# Patient Record
Sex: FEMALE | Race: OTHER
[De-identification: names, ages, dates, MRNs, and addresses within clinical notes are randomized per-mention and may not be internally consistent; named-entity substitution may affect disease eponyms.]

---

## 2017-02-03 ENCOUNTER — HOSPITAL ENCOUNTER (OUTPATIENT)
Dept: HOSPITAL 62 - CCC | Age: 52
End: 2017-02-03
Payer: COMMERCIAL

## 2017-02-03 DIAGNOSIS — I10: Primary | ICD-10-CM

## 2017-02-03 LAB
ALBUMIN SERPL-MCNC: 4.3 G/DL (ref 3.5–5)
ALP SERPL-CCNC: 101 U/L (ref 38–126)
ALT SERPL-CCNC: 27 U/L (ref 9–52)
ANION GAP SERPL CALC-SCNC: 10 MMOL/L (ref 5–19)
AST SERPL-CCNC: 22 U/L (ref 14–36)
BILIRUB DIRECT SERPL-MCNC: 0 MG/DL (ref 0–0.3)
BILIRUB SERPL-MCNC: 0.4 MG/DL (ref 0.2–1.3)
BUN SERPL-MCNC: 26 MG/DL (ref 7–20)
CALCIUM: 9.7 MG/DL (ref 8.4–10.2)
CHLORIDE SERPL-SCNC: 101 MMOL/L (ref 98–107)
CO2 SERPL-SCNC: 32 MMOL/L (ref 22–30)
CREAT SERPL-MCNC: 0.67 MG/DL (ref 0.52–1.25)
GLUCOSE SERPL-MCNC: 94 MG/DL (ref 75–110)
POTASSIUM SERPL-SCNC: 4 MMOL/L (ref 3.6–5)
PROT SERPL-MCNC: 7.4 G/DL (ref 6.3–8.2)
SODIUM SERPL-SCNC: 142.9 MMOL/L (ref 137–145)

## 2017-02-03 PROCEDURE — 36415 COLL VENOUS BLD VENIPUNCTURE: CPT

## 2017-02-03 PROCEDURE — 80061 LIPID PANEL: CPT

## 2017-02-03 PROCEDURE — 80053 COMPREHEN METABOLIC PANEL: CPT

## 2017-02-03 PROCEDURE — 84436 ASSAY OF TOTAL THYROXINE: CPT

## 2017-02-03 PROCEDURE — 84443 ASSAY THYROID STIM HORMONE: CPT

## 2017-02-03 PROCEDURE — 83036 HEMOGLOBIN GLYCOSYLATED A1C: CPT

## 2017-02-07 LAB
CHOLEST SERPL-MCNC: 212.31 MG/DL (ref 0–200)
DIRECT HDL: 47 MG/DL (ref 40–?)
LDLC SERPL DIRECT ASSAY-MCNC: 157 MG/DL (ref ?–100)
TRIGL SERPL-MCNC: 71 MG/DL (ref ?–150)
VLDLC SERPL CALC-MCNC: 14 MG/DL (ref 10–31)

## 2019-08-06 ENCOUNTER — HOSPITAL ENCOUNTER (EMERGENCY)
Dept: HOSPITAL 62 - ER | Age: 54
Discharge: HOME | End: 2019-08-06
Payer: SELF-PAY

## 2019-08-06 VITALS — DIASTOLIC BLOOD PRESSURE: 89 MMHG | SYSTOLIC BLOOD PRESSURE: 146 MMHG

## 2019-08-06 DIAGNOSIS — S80.211A: ICD-10-CM
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
## 2019-08-06 DIAGNOSIS — S80.00XA: Primary | ICD-10-CM
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
## 2019-08-06 DIAGNOSIS — S90.416A: ICD-10-CM

## 2019-08-06 DIAGNOSIS — S90.819A: ICD-10-CM

## 2019-08-06 DIAGNOSIS — F17.200: ICD-10-CM

## 2019-08-06 DIAGNOSIS — I10: ICD-10-CM

## 2019-08-06 PROCEDURE — 99283 EMERGENCY DEPT VISIT LOW MDM: CPT
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
## 2019-08-06 NOTE — RADIOLOGY REPORT (SQ)
EXAM DESCRIPTION:  FOOT RIGHT COMPLETE



COMPLETED DATE/TIME:  8/6/2019 9:30 am



REASON FOR STUDY:  foot injury



COMPARISON:  None.



NUMBER OF VIEWS:  Three views.



TECHNIQUE:  AP, lateral and oblique  radiographic images acquired of the right foot.



LIMITATIONS:  None.



FINDINGS:  MINERALIZATION: Normal.

BONES: No acute fracture or dislocation.  No worrisome bone lesions.

JOINTS: Hallux valgus deformity right great toe.  Mild medial soft tissue swelling.  Minimal bony spu
rring along the dorsal aspect of the 2nd and 3rd tarsometatarsal joints

SOFT TISSUES: 1st metatarsophalangeal joint soft tissue swelling.  No foreign body.

OTHER: No other significant finding.



IMPRESSION:  No acute findings



TECHNICAL DOCUMENTATION:  JOB ID:  [id number]

 2011 [employer]- All Rights Reserved



Reading location - IP/workstation name: [email or web address]

## 2019-10-09 ENCOUNTER — HOSPITAL ENCOUNTER (EMERGENCY)
Dept: HOSPITAL 62 - ER | Age: 54
Discharge: HOME | End: 2019-10-09
Payer: COMMERCIAL

## 2019-10-09 VITALS — DIASTOLIC BLOOD PRESSURE: 87 MMHG | SYSTOLIC BLOOD PRESSURE: 134 MMHG

## 2019-10-09 DIAGNOSIS — M25.561: ICD-10-CM

## 2019-10-09 DIAGNOSIS — E78.00: ICD-10-CM

## 2019-10-09 DIAGNOSIS — I10: ICD-10-CM

## 2019-10-09 DIAGNOSIS — F17.200: ICD-10-CM

## 2019-10-09 DIAGNOSIS — Z90.710: ICD-10-CM

## 2019-10-09 DIAGNOSIS — M25.521: ICD-10-CM

## 2019-10-09 DIAGNOSIS — M79.641: Primary | ICD-10-CM

## 2019-10-09 DIAGNOSIS — E78.5: ICD-10-CM

## 2019-10-09 PROCEDURE — 99283 EMERGENCY DEPT VISIT LOW MDM: CPT

## 2019-10-09 NOTE — RADIOLOGY REPORT (SQ)
EXAM DESCRIPTION:  ELBOW RIGHT OVER 2 VIEWS



COMPLETED DATE/TIME:  10/9/2019 11:30 am



REASON FOR STUDY:  pain



COMPARISON:  None.



NUMBER OF VIEWS:  Four views.



TECHNIQUE:  AP, lateral, and both oblique radiographic images acquired of the right elbow.



LIMITATIONS:  None.



FINDINGS:  MINERALIZATION: Normal.

BONES: No acute fracture or dislocation.  No worrisome bone lesions.

JOINT: No effusion.

SOFT TISSUES: No soft tissue swelling.  No foreign body.

OTHER: No other significant finding.



IMPRESSION:  NEGATIVE STUDY OF THE RIGHT ELBOW. NO RADIOGRAPHIC EVIDENCE OF ACUTE INJURY.



TECHNICAL DOCUMENTATION:  JOB ID:  [id number]

 2011 [employer]- All Rights Reserved



Reading location - [email or web address]

## 2019-10-09 NOTE — ER DOCUMENT REPORT
HPI





- HPI


Time Seen by Provider: 10/09/19 10:57


Pain Level: 5


Context: 





Patient is a 54-year-old female with a history of hypertension, hyperlipidemia, 

and arthritis who presents emergency department with a chief complaint of right 

hand pain, knee pain, and elbow pain.  She has been diagnosed with carpal tunnel

syndrome.  She wears her brace periodically.  She just recently started a new 

job in a mess drew, which has aggravated her carpal tunnel and her arthritis in 

her knee.  She took Goody powders to help with pain, which has given her a 

little relief.  She also uses Voltaren gel at home.





- ROS


Systems Reviewed and Negative: Yes All other systems reviewed and negative





- REPRODUCTIVE


Reproductive: DENIES: Pregnant:





- MUSCULOSKELETAL


Musculoskeletal: REPORTS: Extremity pain - Right knee, hand, and wrist





- DERM


Skin Color: Normal


Skin Problems: None





Past Medical History





- General


Information source: Patient





- Social History


Smoking Status: Current Every Day Smoker


Chew tobacco use (# tins/day): No


Frequency of alcohol use: Rare


Drug Abuse: None


Family History: Reviewed & Not Pertinent, CAD - Brother with 3 vessel bypass at 

53 years old., DM, Hypertension


Patient has suicidal ideation: No


Patient has homicidal ideation: No





- Past Medical History


Cardiac Medical History: Reports: Hx Hypercholesterolemia, Hx Hypertension


   Denies: Hx Coronary Artery Disease, Hx Heart Attack


Pulmonary Medical History: 


   Denies: Hx Asthma, Hx Bronchitis, Hx COPD, Hx Pneumonia, Hx Tuberculosis


Neurological Medical History: Reports: Hx Migraine.  Denies: Hx Cerebrovascular 

Accident, Hx Seizures


Renal/ Medical History: Denies: Hx Peritoneal Dialysis


GI Medical History: Reports: Hx Gastroesophageal Reflux Disease


Musculoskeletal Medical History: Denies Hx Arthritis


Past Surgical History: Reports: Hx Breast Surgery - lumpectomy, Hx Hysterectomy,

Hx Tubal Ligation.  Denies: Hx Pacemaker





- Immunizations


Hx Diphtheria, Pertussis, Tetanus Vaccination: Yes





Vertical Provider Document





- CONSTITUTIONAL


Agree With Documented VS: Yes


Exam Limitations: No Limitations


General Appearance: No Apparent Distress





- INFECTION CONTROL


TRAVEL OUTSIDE OF THE [place] IN LAST 30 DAYS: No





- HEENT


HEENT: Atraumatic, Normocephalic, PERRLA





- NECK


Neck: Normal Inspection





- RESPIRATORY


Respiratory: No Respiratory Distress





- CARDIOVASCULAR


Cardiovascular: Regular Rate, Regular Rhythm


Pulses: Normal: Radial, Dorsalis pedis





- MUSCULOSKELETAL/EXTREMETIES


Musculoskeletal/Extremeties: FROM, Tender - Right knee and right wrist





- NEURO


Level of Consciousness: Awake, Alert, Appropriate


Motor/Sensory: No Motor Deficit, No Sensory Deficit





- DERM


Integumentary: Warm, Dry, No Rash





Course





- Re-evaluation


Re-evalutation: 





10/09/19


There are no acute findings in the patient's x-rays at that time.  I suspect her

hand pain is an exacerbation of her carpal tunnel syndrome and her knee pain is 

from her arthritis.  She will be referred to orthopedics for further management.

 I encouraged her to continue to wear her Ace wrap or splint to help with her 

carpal tunnel.  She will also receive a prescription for naproxen for pain 

relief.  I instructed her not to take ibuprofen at this time.  Capillary refill 

less than 3 seconds.  Peripheral pulses 2+.  No vascular compromise noted.  Pat

ient able to move all limbs and digits with no difficulty.  Follow-up 

precautions were given.  Verbal discharge instructions were given to the 

patient.  They verbalized understanding.  They are stable for discharge.














- Vital Signs


Vital signs: 


                                        











Temp Pulse Resp BP Pulse Ox


 


 98.0 F   64   18   134/87 H  98 


 


 10/09/19 10:48  10/09/19 10:48  10/09/19 10:48  10/09/19 10:48  10/09/19 10:48














Discharge





- Discharge


Clinical Impression: 


Right knee pain


Qualifiers:


 Chronicity: chronic Qualified Code(s): M25.561 - Pain in right knee





Condition: Stable


Disposition: HOME, SELF-CARE


Additional Instructions: 


You are seen today in the emergency department for right hand pain, elbow pain, 

and knee pain.  The pain you are having is most likely due to your arthritis and

your carpal tunnel.  Please continue to use an Ace wrap or your brace to help 

with your symptoms.  You are being prescribed naproxen, medication to help with 

pain.  Please take this as directed.  Please follow-up with orthopedics in 

regards to this visit.  Please establish primary care provider.  They may be 

able to refer you for physical therapy.


Prescriptions: 


Naproxen Sodium [All Day Pain Relief] 220 mg PO BIDP PRN #60 tablet


 PRN Reason: 


Forms:  Return to Work


Referrals: 


CARLYN RODRIGUES MD [ACTIVE STAFF] - Follow up in 1 week

## 2019-10-09 NOTE — RADIOLOGY REPORT (SQ)
EXAM DESCRIPTION:  KNEE RIGHT 4 VIEWS



COMPLETED DATE/TIME:  10/9/2019 11:30 am



REASON FOR STUDY:  pain/arthritis



COMPARISON:  7/28/2012



NUMBER OF VIEWS:  Four views.



TECHNIQUE:  AP, lateral, and both oblique radiographic images acquired of the right knee.



LIMITATIONS:  None.



FINDINGS:  MINERALIZATION: Normal.

BONES: No acute fracture or dislocation.  No worrisome bone lesions.

JOINT: No effusion.

SOFT TISSUES: No soft tissue swelling.  No radio-opaque foreign body.

OTHER: No other significant finding.



IMPRESSION:  NEGATIVE STUDY OF THE RIGHT KNEE. NO RADIOGRAPHIC EVIDENCE OF ACUTE INJURY.



TECHNICAL DOCUMENTATION:  JOB ID:  [id number]

 2011 [employer]- All Rights Reserved



Reading location - IP/workstation name: [email or web address]

## 2019-10-09 NOTE — RADIOLOGY REPORT (SQ)
EXAM DESCRIPTION:  HAND RIGHT 3 VIEWS



COMPLETED DATE/TIME:  10/9/2019 11:30 am



REASON FOR STUDY:  pain/arthritis



COMPARISON:  7/12/2016



EXAM PARAMETERS:  NUMBER OF VIEWS: Three views.

TECHNIQUE: AP, lateral and oblique  radiographic images acquired of the right hand.

LIMITATIONS: None.



FINDINGS:  MINERALIZATION: Normal.

BONES: No acute fracture or dislocation.  No worrisome bone lesions.

JOINTS: No effusions.

SOFT TISSUES: No soft tissue swelling.  No foreign body.

OTHER: No other significant finding.



IMPRESSION:  NEGATIVE STUDY OF THE RIGHT HAND. NO RADIOGRAPHIC EVIDENCE OF ACUTE INJURY.



TECHNICAL DOCUMENTATION:  JOB ID:  [id number]

 2011 [employer]- All Rights Reserved



Reading location - IP/workstation name: [email or web address]

## 2019-12-22 ENCOUNTER — HOSPITAL ENCOUNTER (EMERGENCY)
Dept: HOSPITAL 62 - ER | Age: 54
Discharge: HOME | End: 2019-12-22
Payer: COMMERCIAL

## 2019-12-22 VITALS — DIASTOLIC BLOOD PRESSURE: 80 MMHG | SYSTOLIC BLOOD PRESSURE: 157 MMHG

## 2019-12-22 DIAGNOSIS — Z85.528: ICD-10-CM

## 2019-12-22 DIAGNOSIS — I10: ICD-10-CM

## 2019-12-22 DIAGNOSIS — R10.13: ICD-10-CM

## 2019-12-22 DIAGNOSIS — E78.00: ICD-10-CM

## 2019-12-22 DIAGNOSIS — R51: Primary | ICD-10-CM

## 2019-12-22 DIAGNOSIS — Z90.710: ICD-10-CM

## 2019-12-22 DIAGNOSIS — F17.200: ICD-10-CM

## 2019-12-22 LAB
A TYPE INFLUENZA AG: NEGATIVE
ADD MANUAL DIFF: NO
ALBUMIN SERPL-MCNC: 4.4 G/DL (ref 3.5–5)
ALP SERPL-CCNC: 116 U/L (ref 38–126)
ANION GAP SERPL CALC-SCNC: 9 MMOL/L (ref 5–19)
AST SERPL-CCNC: 20 U/L (ref 14–36)
B INFLUENZA AG: NEGATIVE
BASOPHILS # BLD AUTO: 0.1 10^3/UL (ref 0–0.2)
BASOPHILS NFR BLD AUTO: 0.7 % (ref 0–2)
BILIRUB DIRECT SERPL-MCNC: 0.2 MG/DL (ref 0–0.4)
BILIRUB SERPL-MCNC: 0.3 MG/DL (ref 0.2–1.3)
BUN SERPL-MCNC: 22 MG/DL (ref 7–20)
CALCIUM: 10 MG/DL (ref 8.4–10.2)
CHLORIDE SERPL-SCNC: 103 MMOL/L (ref 98–107)
CO2 SERPL-SCNC: 31 MMOL/L (ref 22–30)
EOSINOPHIL # BLD AUTO: 0.2 10^3/UL (ref 0–0.6)
EOSINOPHIL NFR BLD AUTO: 1.8 % (ref 0–6)
ERYTHROCYTE [DISTWIDTH] IN BLOOD BY AUTOMATED COUNT: 14.5 % (ref 11.5–14)
GLUCOSE SERPL-MCNC: 99 MG/DL (ref 75–110)
HCT VFR BLD CALC: 45.6 % (ref 36–47)
HGB BLD-MCNC: 15.3 G/DL (ref 12–15.5)
LYMPHOCYTES # BLD AUTO: 2.6 10^3/UL (ref 0.5–4.7)
LYMPHOCYTES NFR BLD AUTO: 29.6 % (ref 13–45)
MCH RBC QN AUTO: 29.1 PG (ref 27–33.4)
MCHC RBC AUTO-ENTMCNC: 33.4 G/DL (ref 32–36)
MCV RBC AUTO: 87 FL (ref 80–97)
MONOCYTES # BLD AUTO: 0.7 10^3/UL (ref 0.1–1.4)
MONOCYTES NFR BLD AUTO: 7.8 % (ref 3–13)
NEUTROPHILS # BLD AUTO: 5.3 10^3/UL (ref 1.7–8.2)
NEUTS SEG NFR BLD AUTO: 60.1 % (ref 42–78)
PLATELET # BLD: 333 10^3/UL (ref 150–450)
POTASSIUM SERPL-SCNC: 4.7 MMOL/L (ref 3.6–5)
PROT SERPL-MCNC: 7.9 G/DL (ref 6.3–8.2)
RBC # BLD AUTO: 5.24 10^6/UL (ref 3.72–5.28)
TOTAL CELLS COUNTED % (AUTO): 100 %
WBC # BLD AUTO: 8.8 10^3/UL (ref 4–10.5)

## 2019-12-22 PROCEDURE — 80053 COMPREHEN METABOLIC PANEL: CPT

## 2019-12-22 PROCEDURE — 71045 X-RAY EXAM CHEST 1 VIEW: CPT

## 2019-12-22 PROCEDURE — 96374 THER/PROPH/DIAG INJ IV PUSH: CPT

## 2019-12-22 PROCEDURE — 85025 COMPLETE CBC W/AUTO DIFF WBC: CPT

## 2019-12-22 PROCEDURE — 93005 ELECTROCARDIOGRAM TRACING: CPT

## 2019-12-22 PROCEDURE — 36415 COLL VENOUS BLD VENIPUNCTURE: CPT

## 2019-12-22 PROCEDURE — 96376 TX/PRO/DX INJ SAME DRUG ADON: CPT

## 2019-12-22 PROCEDURE — 96361 HYDRATE IV INFUSION ADD-ON: CPT

## 2019-12-22 PROCEDURE — 84484 ASSAY OF TROPONIN QUANT: CPT

## 2019-12-22 PROCEDURE — 99285 EMERGENCY DEPT VISIT HI MDM: CPT

## 2019-12-22 PROCEDURE — 96375 TX/PRO/DX INJ NEW DRUG ADDON: CPT

## 2019-12-22 PROCEDURE — 87804 INFLUENZA ASSAY W/OPTIC: CPT

## 2019-12-22 PROCEDURE — 70450 CT HEAD/BRAIN W/O DYE: CPT

## 2019-12-22 PROCEDURE — 83690 ASSAY OF LIPASE: CPT

## 2019-12-22 PROCEDURE — 93010 ELECTROCARDIOGRAM REPORT: CPT

## 2019-12-22 NOTE — ER DOCUMENT REPORT
ED General





- General


Chief Complaint: Headache


Stated Complaint: BLOOD PRESSURE CONCERNS


Time Seen by Provider: 12/22/19 13:39


TRAVEL OUTSIDE OF THE [place] IN LAST 30 DAYS: No





- HPI


Notes: 





54-year-old female to the emergency department with multiple complaints.  

Patient's first complaint is that she has had a headache to the top of her head 

for nearly 1 month.  She states that she has been out of her blood pressure 

medicine HCTZ 25 mg and lisinopril 20 mg for about 1 month.  She states that she

typically gets headaches when she is not on her blood pressure medicine.  She 

denies any difficulty speaking, walking, numbness and tingling, blurry vision, 

dizziness, shortness of breath.  She states that for the past 36 hours she has 

been experiencing epigastric abdominal pain that radiates into her chest with 

some nausea and vomiting.  She states that she was at work at 2 AM on Saturday 

morning when her symptoms began.  She states initially she thought that she was 

constipated and needed to use the restroom but found that was not the case.  She

states the pain was constant through that day and she ended up spending most of 

the day in the bed at her daughter's house.  She states that she took some Tums 

and had a little bit of relief of her symptoms but then at about 2 AM this 

morning she began to have vomiting.  She states when she vomits she becomes 

diaphoretic.  She denies shortness of breath or increase in chest pain when she 

is exerting herself.  She states mainly is within the abdomen and then kind of 

comes up into the chest.  She states she continues to feel little bit of 

abdominal pain and chest pain currently.  She denies any history of cardiac 

events in herself.  Her brother who is 59 has had a heart attack and a triple 

bypass.  Other than that there is no other family history of coronary artery 

disease.  She is a smoker.  She does have a history of hypertension.  She denies

a history of diabetes or hyperlipidemia.  She has a history of renal cancer but 

she is currently in remission.  She denies any leg swelling, denies history of 

DVT.  She is not on any hormone replacement therapy.





- Related Data


Allergies/Adverse Reactions: 


                                        





Iodinated Contrast Media Allergy (Intermediate, Verified 12/22/19 13:36)


   Hives


iodine [Iodine] Allergy (Intermediate, Verified 12/22/19 13:36)


   Hives


CHLORINE Adverse Reaction (Uncoded 12/22/19 13:36)


   Hives











Past Medical History





- General


Information source: Patient





- Social History


Smoking Status: Current Every Day Smoker


Lives with: Family


Family History: Reviewed & Not Pertinent, CAD - Brother with 3 vessel bypass at 

53 years old., DM, Hypertension


Patient has suicidal ideation: No


Patient has homicidal ideation: No





- Past Medical History


Cardiac Medical History: Reports: Hx Hypercholesterolemia, Hx Hypertension


   Denies: Hx Coronary Artery Disease, Hx Heart Attack


Pulmonary Medical History: 


   Denies: Hx Asthma, Hx Bronchitis, Hx COPD, Hx Pneumonia, Hx Tuberculosis


Neurological Medical History: Reports: Hx Migraine.  Denies: Hx Cerebrovascular 

Accident, Hx Seizures


Renal/ Medical History: Denies: Hx Peritoneal Dialysis


GI Medical History: Reports: Hx Gastroesophageal Reflux Disease


Musculoskeletal Medical History: Denies Hx Arthritis


Past Surgical History: Reports: Hx Breast Surgery - lumpectomy, Hx Hysterectomy,

 Hx Tubal Ligation.  Denies: Hx Pacemaker





- Immunizations


Hx Diphtheria, Pertussis, Tetanus Vaccination: Yes





Review of Systems





- Review of Systems


Constitutional: Diaphoresis - Sweaty when vomiting.  denies: Chills, Fever


EENT: No symptoms reported


Cardiovascular: Chest pain.  denies: Palpitations, Heart racing, Syncope, 

Dizziness, Lightheaded


Respiratory: denies: Cough, Short of breath


Gastrointestinal: See HPI, Abdominal pain, Nausea, Vomiting.  denies: Diarrhea


Genitourinary: No symptoms reported


Female Genitourinary: No symptoms reported


Musculoskeletal: No symptoms reported


Skin: No symptoms reported


Hematologic/Lymphatic: No symptoms reported


Neurological/Psychological: Headaches.  denies: Sensory change, Gait changes, 

Loss of power, Paralysis, Speech impairment, Numbness, Tingling


-: Yes All other systems reviewed and negative





Physical Exam





- Vital signs


Vitals: 


                                        











Temp Pulse Resp BP Pulse Ox


 


 98.6 F   95   16   172/96 H  95 


 


 12/22/19 13:22  12/22/19 13:22  12/22/19 13:22  12/22/19 13:22  12/22/19 13:22











Interpretation: Hypertensive





- General


General appearance: Appears well, Alert





- HEENT


Head: Normocephalic, Atraumatic


Eyes: Normal


Pupils: PERRL


Ears: Normal


External canal: Normal


Tympanic membrane: Normal


Sinus: Normal


Nasal: Normal


Mouth/Lips: Normal


Pharynx: Normal.  No: Potential airway comprom.


Neck: Normal, Supple.  No: Lymphadenopathy, Meningismus





- Respiratory


Respiratory status: No respiratory distress


Chest status: Nontender


Breath sounds: Normal.  No: Rales, Rhonchi, Stridor, Wheezing


Chest palpation: Normal





- Cardiovascular


Rhythm: Regular


Heart sounds: Normal auscultation


Murmur: No





- Abdominal


Inspection: Normal


Distension: No distension


Bowel sounds: Normal


Tenderness: Tender - Positive epigastric tenderness to palpation.  Negative 

right upper quadrant tenderness to palpation.  No guarding, no rebound.  

Negative McBurney's point, negative CVA tenderness.  No: McBurney's point, 

Lucas's sign, Rebound


Organomegaly: No organomegaly





- Back


Back: Normal, Nontender





- Neurological


Neuro grossly intact: Yes


Cognition: Normal


Orientation: AAOx4


[place] Coma Scale Eye Opening: Spontaneous


[place] Coma Scale Verbal: Oriented


[place] Coma Scale Motor: Obeys Commands


Sadiq Coma Scale Total: 15


Speech: Normal


Cranial nerves: Normal


Cerebellar coordination: Normal


Motor strength normal: LUE, RUE, LLE, RLE


Additional motor exam normals: Equal .  No: Pronator drift


Sensory: Normal





- Psychological


Associated symptoms: Normal affect, Normal mood





- Skin


Skin Temperature: Warm


Skin Moisture: Dry


Skin Color: Normal





Course





- Re-evaluation


Re-evalutation: 





12/22/19 16:12


patient with episode of vomiting.  ordered zofran.  Will continue to monitor the

 patient. 





12/22/19 18:25


Patient improved significantly and tolerated PO.   Will discharge home.   





Impression:  NV, epigastric pain, headache, HTN, med refill.  Low suspicion that

 her epigastric pain that radiates into her chest is cardiac in origin -- it's 

been constant for nearly 24 hours.  She has a HEART SCORE of 3.   She has reas

suring imaging studies.  Trop is negative.  low wells criteria. She has an 

appointment with [facility] for January 13th and I have encouraged her to

 keep it. 





- Vital Signs


Vital signs: 


                                        











Temp Pulse Resp BP Pulse Ox


 


 98 F   95   23 H  160/94 H  98 


 


 12/22/19 16:01  12/22/19 13:22  12/22/19 17:01  12/22/19 17:01  12/22/19 17:01














- Laboratory


Result Diagrams: 


                                 12/22/19 15:05





                                 12/22/19 15:05


Laboratory results interpreted by me: 


                                        











  12/22/19 12/22/19





  15:05 15:05


 


RDW  14.5 H 


 


Carbon Dioxide   31 H


 


BUN   22 H














- Diagnostic Test


Radiology reviewed: Image reviewed, Reports reviewed





Discharge





- Discharge


Clinical Impression: 


 Epigastric abdominal pain





Vomiting


Qualifiers:


 Vomiting type: unspecified Vomiting Intractability: non-intractable Nausea 

presence: with nausea Qualified Code(s): R11.2 - Nausea with vomiting, 

unspecified





Hypertension


Qualifiers:


 Hypertension type: essential hypertension Qualified Code(s): I10 - Essential 

(primary) hypertension





Headache


Qualifiers:


 Headache type: unspecified Headache chronicity pattern: acute headache 

Intractability: not intractable Qualified Code(s): R51 - Headache





Condition: Stable


Disposition: HOME, SELF-CARE


Instructions:  Abdominal Pain ([facility]), High Blood Pressure ([facility]), Vomiting ([facility])


Additional Instructions: 


PUSH FLUIDS.  BLAND DIET AS TOLERATED.  TAKE YOUR BLOOD PRESSURE MEDICINE 

WITHOUT FAIL.   RETURN IF WORSENING SYMPTOMS. 


Prescriptions: 


Ondansetron [Zofran Odt 4 mg Tablet] 1 - 2 tab PO Q4HP PRN #10 tab.rapdis


 PRN Reason: 


Lisinopril/Hydrochlorothiazide [Lisinopril-Hctz 20-25 mg Tab] 1 each PO DAILY 

#30 tablet

## 2019-12-22 NOTE — RADIOLOGY REPORT (SQ)
EXAM DESCRIPTION:  CT HEAD WITHOUT



COMPLETED DATE/TIME:  12/22/2019 2:52 pm



REASON FOR STUDY:  severe headache



COMPARISON:  None.



TECHNIQUE:  Axial images acquired through the brain without intravenous contrast.  Images reviewed wi
th bone, brain and subdural windows.  Additional sagittal and coronal reconstructions were generated.
 Images stored on PACS.

All CT scanners at this facility use dose modulation, iterative reconstruction, and/or weight based d
osing when appropriate to reduce radiation dose to as low as reasonably achievable (ALARA).

[facility]: Dose Right  [facility]: CareDose    [facility]: Dose Right    [facility]: Teradose 4D    [facility]: Smart Technologies



RADIATION DOSE:  CT Rad equipment meets quality standard of care and radiation dose reduction techniq
ues were employed. CTDIvol: 53.2 mGy. DLP: 964 mGy-cm. mGy.



LIMITATIONS:  None.



FINDINGS:  VENTRICLES: Normal size and contour.

CEREBRUM: No masses.  No hemorrhage.  No midline shift.  No evidence for acute infarction. Normal gra
y/white matter differentiation. No areas of low density in the white matter.

CEREBELLUM: No masses.  No hemorrhage.  No alteration of density.  No evidence for acute infarction.

EXTRAAXIAL SPACES: No fluid collections.  No masses.

ORBITS AND GLOBE: No intra- or extraconal masses.  Normal contour of globe without masses.

CALVARIUM: No fracture.

PARANASAL SINUSES: No fluid or mucosal thickening.

SOFT TISSUES: No mass or hematoma.

OTHER: No other significant finding.



IMPRESSION:  NORMAL BRAIN CT WITHOUT CONTRAST.

EVIDENCE OF ACUTE STROKE: NO.



COMMENT:  Quality ID # [id number]: Final reports with documentation of one or more dose reduction techniques
 (e.g., Automated exposure control, adjustment of the mA and/or kV according to patient size, use of 
iterative reconstruction technique)



TECHNICAL DOCUMENTATION:  JOB ID:  [id number]

 2011 [employer]- All Rights Reserved



Reading location - IP/workstation name: [email or web address]

## 2019-12-22 NOTE — RADIOLOGY REPORT (SQ)
EXAM DESCRIPTION:  CHEST SINGLE VIEW



COMPLETED DATE/TIME:  12/22/2019 2:52 pm



REASON FOR STUDY:  chest pain



COMPARISON:  6/10/2016



EXAM PARAMETERS:  NUMBER OF VIEWS: One view.

TECHNIQUE: Single frontal radiographic view of the chest acquired.

RADIATION DOSE: NA

LIMITATIONS: None.



FINDINGS:  LUNGS AND PLEURA: No opacities, masses or pneumothorax. No pleural effusion.

MEDIASTINUM AND HILAR STRUCTURES: No masses.  Contour normal.

HEART AND VASCULAR STRUCTURES: Heart normal in size.  Normal vasculature.

BONES: No acute findings.

HARDWARE: None in the chest.

OTHER: No other significant finding.



IMPRESSION:  NO ACUTE RADIOGRAPHIC FINDING IN THE CHEST.



TECHNICAL DOCUMENTATION:  JOB ID:  [id number]

 2011 [employer]- All Rights Reserved



Reading location - IP/workstation name: [email or web address]

## 2019-12-22 NOTE — ER DOCUMENT REPORT
ED Medical Screen (RME)





- General


Chief Complaint: Headache


Stated Complaint: BLOOD PRESSURE CONCERNS


Time Seen by Provider: 12/22/19 13:39


Notes: 





54-year-old female with history of kidney cancer presents to the emergency 

department with multiple chief complaints, the primary one being a severe 

headache.  Patient states that she ran out of her antihypertensives 1 month ago 

and has had a headache develop since then.  She states that every time she runs 

out of her blood pressure medication she gets a severe headache and is concerned

that it might be blood pressure related.  Also, she had some left sided chest 

pain approximately 48 hours ago that persisted for about 12 hours, was constant 

and dull, did not radiate, was not relieved by antacids or passing gas.  Patient

states she was also clammy and nauseated during that time but has since 

resolved. 





Exam: Well-appearing no acute distress, lungs are clear to auscultation all 

fields, regular cardiac rate and rhythm S1-S2 heard no murmurs. 





NEURO: A &O X 3,  normal speech, normal gailt, PERRL, EOMI, SILT, follows 

commands in all 4 extremities, no gross abnormalities of cranial nerves, no 

focal neuro deficits, no pronator drift, finger-to-nose testing normal, rapid 

alternating hand movements normal, heel-to-shin normal,  strength 5/5 

bilateral, 5/5 strength in both proximal and distal upper and lower extremities





I have greeted and performed a rapid initial assessment of this patient.  A 

comprehensive ED assessment and evaluation of the patient, analysis of test 

results and completion of medical decision making process will be conducted by 

an additional ED providers.


TRAVEL OUTSIDE OF THE [place] IN LAST 30 DAYS: No





- Related Data


Allergies/Adverse Reactions: 


                                        





Iodinated Contrast Media Allergy (Intermediate, Verified 12/22/19 13:36)


   Hives


iodine [Iodine] Allergy (Intermediate, Verified 12/22/19 13:36)


   Hives


CHLORINE Adverse Reaction (Uncoded 12/22/19 13:36)


   Hives











Past Medical History





- Past Medical History


Cardiac Medical History: Reports: Hx Hypercholesterolemia, Hx Hypertension


   Denies: Hx Coronary Artery Disease, Hx Heart Attack


Pulmonary Medical History: 


   Denies: Hx Asthma, Hx Bronchitis, Hx COPD, Hx Pneumonia, Hx Tuberculosis


Neurological Medical History: Reports: Hx Migraine.  Denies: Hx Cerebrovascular 

Accident, Hx Seizures


Renal/ Medical History: Denies: Hx Peritoneal Dialysis


GI Medical History: Reports: Hx Gastroesophageal Reflux Disease


Musculoskeltal Medical History: Denies Hx Arthritis


Past Surgical History: Reports: Hx Breast Surgery - lumpectomy, Hx Hysterectomy,

 Hx Tubal Ligation.  Denies: Hx Pacemaker





- Immunizations


Hx Diphtheria, Pertussis, Tetanus Vaccination: Yes





Physical Exam





- Vital signs


Vitals: 





                                        











Temp Pulse Resp BP Pulse Ox


 


 98.6 F   95   16   172/96 H  95 


 


 12/22/19 13:22  12/22/19 13:22  12/22/19 13:22  12/22/19 13:22  12/22/19 13:22














Course





- Vital Signs


Vital signs: 





                                        











Temp Pulse Resp BP Pulse Ox


 


 98.6 F   95   16   172/96 H  95 


 


 12/22/19 13:22  12/22/19 13:22  12/22/19 13:22  12/22/19 13:22  12/22/19 13:22

## 2020-06-08 ENCOUNTER — HOSPITAL ENCOUNTER (EMERGENCY)
Dept: HOSPITAL 62 - ER | Age: 55
LOS: 1 days | Discharge: HOME | End: 2020-06-09
Payer: COMMERCIAL

## 2020-06-08 DIAGNOSIS — R10.11: ICD-10-CM

## 2020-06-08 DIAGNOSIS — I10: ICD-10-CM

## 2020-06-08 DIAGNOSIS — N28.89: ICD-10-CM

## 2020-06-08 DIAGNOSIS — Z88.8: ICD-10-CM

## 2020-06-08 DIAGNOSIS — R31.9: ICD-10-CM

## 2020-06-08 DIAGNOSIS — R10.9: Primary | ICD-10-CM

## 2020-06-08 DIAGNOSIS — F17.200: ICD-10-CM

## 2020-06-08 LAB
ADD MANUAL DIFF: NO
ALBUMIN SERPL-MCNC: 4.5 G/DL (ref 3.5–5)
ALP SERPL-CCNC: 118 U/L (ref 38–126)
ANION GAP SERPL CALC-SCNC: 7 MMOL/L (ref 5–19)
APPEARANCE UR: (no result)
APTT PPP: YELLOW S
AST SERPL-CCNC: 26 U/L (ref 14–36)
BASOPHILS # BLD AUTO: 0.1 10^3/UL (ref 0–0.2)
BASOPHILS NFR BLD AUTO: 1 % (ref 0–2)
BILIRUB DIRECT SERPL-MCNC: 0 MG/DL (ref 0–0.4)
BILIRUB SERPL-MCNC: 0.3 MG/DL (ref 0.2–1.3)
BILIRUB UR QL STRIP: NEGATIVE
BUN SERPL-MCNC: 27 MG/DL (ref 7–20)
CALCIUM: 10.3 MG/DL (ref 8.4–10.2)
CHLORIDE SERPL-SCNC: 103 MMOL/L (ref 98–107)
CO2 SERPL-SCNC: 28 MMOL/L (ref 22–30)
EOSINOPHIL # BLD AUTO: 0.5 10^3/UL (ref 0–0.6)
EOSINOPHIL NFR BLD AUTO: 5.5 % (ref 0–6)
ERYTHROCYTE [DISTWIDTH] IN BLOOD BY AUTOMATED COUNT: 14.3 % (ref 11.5–14)
GLUCOSE SERPL-MCNC: 100 MG/DL (ref 75–110)
GLUCOSE UR STRIP-MCNC: NEGATIVE MG/DL
HCT VFR BLD CALC: 42.1 % (ref 36–47)
HGB BLD-MCNC: 14.3 G/DL (ref 12–15.5)
KETONES UR STRIP-MCNC: NEGATIVE MG/DL
LYMPHOCYTES # BLD AUTO: 3.8 10^3/UL (ref 0.5–4.7)
LYMPHOCYTES NFR BLD AUTO: 46.1 % (ref 13–45)
MCH RBC QN AUTO: 29.9 PG (ref 27–33.4)
MCHC RBC AUTO-ENTMCNC: 34.1 G/DL (ref 32–36)
MCV RBC AUTO: 88 FL (ref 80–97)
MONOCYTES # BLD AUTO: 0.7 10^3/UL (ref 0.1–1.4)
MONOCYTES NFR BLD AUTO: 7.9 % (ref 3–13)
NEUTROPHILS # BLD AUTO: 3.3 10^3/UL (ref 1.7–8.2)
NEUTS SEG NFR BLD AUTO: 39.5 % (ref 42–78)
NITRITE UR QL STRIP: NEGATIVE
PH UR STRIP: 5 [PH] (ref 5–9)
PLATELET # BLD: 324 10^3/UL (ref 150–450)
POTASSIUM SERPL-SCNC: 4.8 MMOL/L (ref 3.6–5)
PROT SERPL-MCNC: 7.9 G/DL (ref 6.3–8.2)
PROT UR STRIP-MCNC: NEGATIVE MG/DL
RBC # BLD AUTO: 4.8 10^6/UL (ref 3.72–5.28)
SP GR UR STRIP: 1.01
TOTAL CELLS COUNTED % (AUTO): 100 %
UROBILINOGEN UR-MCNC: NEGATIVE MG/DL (ref ?–2)
WBC # BLD AUTO: 8.3 10^3/UL (ref 4–10.5)

## 2020-06-08 PROCEDURE — 81001 URINALYSIS AUTO W/SCOPE: CPT

## 2020-06-08 PROCEDURE — 74177 CT ABD & PELVIS W/CONTRAST: CPT

## 2020-06-08 PROCEDURE — 80053 COMPREHEN METABOLIC PANEL: CPT

## 2020-06-08 PROCEDURE — 85025 COMPLETE CBC W/AUTO DIFF WBC: CPT

## 2020-06-08 PROCEDURE — 96375 TX/PRO/DX INJ NEW DRUG ADDON: CPT

## 2020-06-08 PROCEDURE — 36415 COLL VENOUS BLD VENIPUNCTURE: CPT

## 2020-06-08 PROCEDURE — 83690 ASSAY OF LIPASE: CPT

## 2020-06-08 PROCEDURE — 99284 EMERGENCY DEPT VISIT MOD MDM: CPT

## 2020-06-08 PROCEDURE — 76705 ECHO EXAM OF ABDOMEN: CPT

## 2020-06-08 PROCEDURE — S0028 INJECTION, FAMOTIDINE, 20 MG: HCPCS

## 2020-06-08 PROCEDURE — 96374 THER/PROPH/DIAG INJ IV PUSH: CPT

## 2020-06-08 NOTE — ER DOCUMENT REPORT
ED GI/





- General


Mode of Arrival: Ambulatory


TRAVEL OUTSIDE OF THE [place] IN LAST 30 DAYS: No





- Related Data


Home Medications: rosuvastatin, omeprazole, lisinopril/hctz, diclofenac gel





<TONY BURDEN - Last Filed: 06/08/20 20:01>





<MIGUE RESENDEZ - Last Filed: 06/09/20 00:41>





- General


Chief Complaint: Upper Abdominal Pain


Stated Complaint: RIGHT FLANK PAIN


Time Seen by Provider: 06/08/20 17:56


Notes: 





HPI: Patient is a 54-year-old female who presents today with some intermittent 

abdominal pain for the last year to the right upper quadrant.  She states mild 

radiation to the back.  She denies any real aggravating or relieving factors.  

She had her gallbladder removed 2 years ago in [place].  While 6 weeks later 

she had a partial left nephrectomy secondary to cancer.  No chemotherapy 

currently.  Patient did have an endoscopy in February and states they found 

ulcers.  She is on acid blocking medication.  She denies any radiation to the 

chest, cough, shortness of breath.








ROS:  See HPI


All other review of systems reviewed and otherwise negative





Reviewed vital signs and nursing note as charted by RN.





PHYSICAL EXAM: 





CONSTITUTIONAL: Alert and oriented and responds appropriately to questions. 

Well-appearing; well-nourished





HEAD: Normocephalic; atraumatic





EYES: Sclerae non-icteric





ENT: Normal nose; no rhinorrhea; moist mucous membranes; pharynx without lesions

noted





NECK: Supple without meningismus; non-tender; no cervical lymphadenopathy, no 

masses





CARD: Regular rate and rhythm; no murmurs; symmetric distal pulses





RESP: Normal chest excursion without splinting or tachypnea; breath sounds clear

and equal bilaterally; no wheezes, no rhonchi, no rales





ABD/GI: Normal bowel sounds; non-distended; soft, minimally tender to palpation 

in the right upper quadrant without rebound or guarding.  No palpable 

hepatosplenomegaly.  No lower abdominal tenderness





BACK: The back appears normal and is non-tender to palpation





EXT: Normal ROM in all joints; non-tender to palpation; no edema





SKIN: No acute lesions noted





NEURO: CN 2-12 intact; 5/5 bilateral upper and lower extremity strength with 

sensation intact to light touch





PSYCH: The patient's mood and manner are appropriate. Grooming and personal 

hygiene are appropriate. (TONY BURDEN)





- Related Data


Allergies/Adverse Reactions: 


                                        





Iodinated Contrast Media Allergy (Intermediate, Verified 06/08/20 17:53)


   Hives


iodine [Iodine] Allergy (Intermediate, Verified 06/08/20 17:53)


   Hives


CHLORINE Adverse Reaction (Uncoded 06/08/20 17:53)


   Hives











Past Medical History





- General


Information source: Patient





- Social History


Smoking Status: Current Every Day Smoker


Chew tobacco use (# tins/day): No


Frequency of alcohol use: None


Drug Abuse: None


Family History: Reviewed & Not Pertinent, CAD - Brother with 3 vessel bypass at 

53 years old., DM, Hypertension


Patient has homicidal ideation: No





- Past Medical History


Cardiac Medical History: Reports: Hx Hypercholesterolemia, Hx Hypertension


   Denies: Hx Coronary Artery Disease, Hx Heart Attack


Pulmonary Medical History: 


   Denies: Hx Asthma, Hx Bronchitis, Hx COPD, Hx Pneumonia, Hx Tuberculosis


Neurological Medical History: Reports: Hx Migraine.  Denies: Hx Cerebrovascular 

Accident, Hx Seizures


Renal/ Medical History: Denies: Hx Peritoneal Dialysis


GI Medical History: Reports: Hx Gastroesophageal Reflux Disease


Musculoskeletal Medical History: Denies Hx Arthritis


Past Surgical History: Reports: Hx Breast Surgery - lumpectomy, Hx 

Cholecystectomy, Hx Hysterectomy, Hx Tonsillectomy, Hx Tubal Ligation.  Denies: 

Hx Pacemaker





- Immunizations


Hx Diphtheria, Pertussis, Tetanus Vaccination: Yes





<TONY BURDEN - Last Filed: 06/08/20 20:01>





Physical Exam





- Vital signs


Vitals: 





                                        











Temp Pulse BP Pulse Ox


 


 98.8 F   87   142/81 H  96 


 


 06/08/20 17:48  06/08/20 17:48  06/08/20 17:48  06/08/20 17:48














Course





- Laboratory


Result Diagrams: 


                                 06/08/20 18:16





                                 06/08/20 18:16





<TONY BURDEN - Last Filed: 06/08/20 20:01>





- Laboratory


Result Diagrams: 


                                 06/08/20 18:16





                                 06/08/20 19:33





<MIGUE RESENDEZ - Last Filed: 06/09/20 00:41>





- Re-evaluation


Re-evalutation: 





Given the history and physical examination the patient had basic labs including 

a liver panel and lipase ordered as well as a right upper quadrant ultrasound.





06/08/20 20:02


Labs as recorded.  GI cocktail has been ordered.  Ultrasound shows no acute 

findings.  I will add a CT scan of the abdomen and pelvis.  I do not believe 

this is of a cardiac etiology at this moment. (TONY BURDEN)





06/09/20 00:36


Patient turned over to me pending results of CT of her abdomen and pelvis.  CT 

report concerning for lesion in right kidney that could represent malignancy.  

Discussed this finding and the possibility that cancer could be causing her 

symptoms and talk to her at length about the importance of closely following up 

with a urologist and her primary doctor regarding this.  Patient demonstrated 

understanding of this, so she will call her primary doctor at [facility] to get referral to urologist, I also will give her information

 for her urologist.  Also informed patient of the blood in her urine and printed

 out results of all lab work, urine, and her CT results that she can bring to 

discuss all of these findings with her primary doctor and her urologist.  Patie

nt feels greatly improved currently and says pain is minimal.  Patient given 

extensive return to ED precautions which she also demonstrated understanding of.

  Patient ready for discharge with outpatient follow-up. (MIGUE RESENDEZ)





- Vital Signs


Vital signs: 





                                        











Temp Pulse Resp BP Pulse Ox


 


 98.0 F   75   20   118/75   100 


 


 06/08/20 21:36  06/08/20 21:36  06/08/20 21:36  06/08/20 21:36  06/08/20 21:36














- Laboratory


Laboratory results interpreted by me: 





                                        











  06/08/20 06/08/20 06/08/20





  18:16 18:16 19:33


 


RDW  14.3 H  


 


Lymph % (Auto)  46.1 H  


 


Seg Neutrophils %  39.5 L  


 


BUN    27 H


 


Calcium    10.3 H


 


Urine Blood   MODERATE H 














Discharge





<TONY BURDEN - Last Filed: 06/08/20 20:01>





<MIGUE RESENDEZ - Last Filed: 06/09/20 00:41>





- Discharge


Clinical Impression: 


 Flank pain, Renal mass, right





Hematuria


Qualifiers:


 Hematuria type: unspecified type Qualified Code(s): R31.9 - Hematuria, 

unspecified





Condition: Stable


Disposition: HOME, SELF-CARE


Additional Instructions: 








     There are many causes of abdominal pain.  Pain can mean a serious problem 

requiring surgery (such as appendicitis). It can also be an innocent problem th

at goes away on its own (such as a viral infection).  Often, time must pass to 

determine the cause of pain.


     The physician does not feel that hospitalization is necessary, at present. 

Things may change within the next 24 hours. Call the doctor or come back for re-

examination if any problems occur, such as:


     (1) Pain that becomes more severe, steady, or becomes concentrated in one 

specific area.  Also, pain that is more severe with movement or coughing.  


     (2) Vomiting that persists or becomes more frequent.  


     (3) Blood in the vomitus, urine, or bowel movements.  Blood in the stool 

may have a tarry or black appearance.


     (4) Shaking chills or fever greater than 100 degrees F. 


     (5) The abdomen becomes more distended or swollen. 


     (6) Bowel movements cease. 


     (7) Failure to improve as expected.








You are found to have a possible mass on your kidney that could be kidney 

cancer.  You also had blood in her urine.  It is extremely important that you 

follow-up with the urologist as soon as possible to have further testing done.  

You should also follow-up with your primary doctor within 1 week.  Bring the CT 

scan results and the lab results that I printed out for you when you follow-up 

with your doctors. if you should have any worsening pain, vomiting that is 

uncontrolled, fever, inability to urinate, or any other worsening or alarming 

symptoms return to the emergency department immediately.


Referrals: 


BRIAN PORTILLO PA-C [Primary Care Provider] - Follow up as needed


[facility] - Follow up as needed

## 2020-06-08 NOTE — RADIOLOGY REPORT (SQ)
EXAM DESCRIPTION:  U/S ABDOMEN LIMITED W/O DOP



IMAGES COMPLETED DATE/TIME:  6/8/2020 7:31 pm



REASON FOR STUDY:  Right upper quadrant/flank pain prior cholecystect



COMPARISON:  None.



TECHNIQUE:  Dynamic and static grayscale images acquired of the abdomen and recorded on PACS. Additio
nal selected color Doppler and spectral images recorded.



LIMITATIONS:  None.



FINDINGS:  PANCREAS: No masses.  Visualized pancreatic duct normal caliber.

LIVER: No masses.  Increased echogenicity.

LIVER VASCULATURE: Normal directional flow of the main portal vein and hepatic veins.

GALLBLADDER: Surgically absent.

ULTRASOUND-DETECTED REYES'S SIGN: Not applicable.

INTRAHEPATIC DUCTS AND COMMON DUCT: CBD and intrahepatic ducts normal caliber. No filling defects.

INFERIOR VENA CAVA: Not imaged.

AORTA: No aneurysm.

RIGHT KIDNEY:  Normal size normal size, 11.4 cm.  Normal echogenicity. No solid or suspicious masses.
 No hydronephrosis. No calcifications.

PERITONEAL AND RIGHT PLEURAL SPACE: No ascites or effusions.

OTHER: No other significant findings.



IMPRESSION:  Hepatic steatosis.  No other significant finding.



TECHNICAL DOCUMENTATION:  JOB ID:  [id number]

 [employer]- All Rights Reserved



Reading location - IP/workstation name: [email or web address]

## 2020-06-08 NOTE — RADIOLOGY REPORT (SQ)
EXAM DESCRIPTION: 



CT ABDOMEN PELVIS WITH IV CONTRAST



COMPLETED DATE/TME:  06/08/2020 19:49



CLINICAL HISTORY: 



54 years, Female, right upper quadrant abdominal pain

intermittent



COMPARISON:

None.



TECHNIQUE:

598  Images stored on PACS.

 

All CT scanners at this facility use dose modulation, iterative

reconstruction, and/or weight based dosing when appropriate to

reduce radiation dose to as low as reasonably achievable (ALARA).





[facility]: Dose Right [facility]: CareDose   [facility]: Dose Right    [facility]:

Teradose 4D    [facility]: Smart Technologies



LIMITATIONS:

None.



FINDINGS:



Limited evaluation of the lung bases is unremarkable. Small

hiatal hernia noted. Osseous structures are grossly intact.

Diffuse fatty infiltrative change to the liver. Status post

cholecystectomy. The spleen, adrenal glands, pancreas are

unremarkable. Left renal cortical scar formation. Nonspecific

hypodensity near the area of scarring measuring approximately 1.4

x 1.8 cm. While this may in part reflect an area of scar

formation, neoplastic etiology is not excluded. This does not

reflect simple cyst. Moderate atheromatous changes. No gross

evidence for bowel obstruction. Colonic diverticulosis without CT

evidence for diverticulitis. No free air or free fluid. The

appendix is not visualized. No pericecal inflammation.





IMPRESSION:



There is an area of left renal cortical scar formation. At this

site there is a somewhat poorly defined hypodense focus which

does not meet criteria for simple cyst. Urologic follow-up

recommended. Consider nonemergent follow-up with dedicated

multiphasic MRI.



Large amount stool in the colon. Colonic diverticulosis without

CT evidence for diverticulitis.



Fatty infiltrative change to the liver

 

TECHNICAL DOCUMENTATION:



Quality ID # 436: Final reports with documentation of one or more

dose reduction techniques (e.g., Automated exposure control,

adjustment of the mA and/or kV according to patient size, use of

iterative reconstruction technique)



copyright 2011 [employer]- All Rights Reserved

## 2020-06-08 NOTE — ER DOCUMENT REPORT
ED Medical Screen (RME)





- General


Chief Complaint: Abdominal Pain


Stated Complaint: RIGHT FLANK PAIN


Time Seen by Provider: 06/08/20 17:56


Mode of Arrival: Ambulatory


Information source: Patient


Notes: 





54-year-old female presented to ED for complaint of right upper quadrant 

abdominal pain.  She has had a cholecystectomy 2 years ago.  She states she does

get this pain off and on but is never lasted as long as the did the day.  She 

has had a hysterectomy in the past.  She does have a history of high blood 

pressure, reflux, colonoscopy that was benign, and kidney cancer on the left the

cancer was removed but the kidney was spared.  She does smoke a pack a day does 

not drink or do any illicit drugs.  Patient is alert oriented respirations 

regular and unlabored speaking in full sentences.  She denies any nausea at this

time.

















I have greeted and performed a rapid initial assessment of this patient.  A 

comprehensive ED assessment and evaluation of the patient, analysis of test 

results and completion of medical decision making process will be conducted by 

an additional ED providers.


TRAVEL OUTSIDE OF THE [place] IN LAST 30 DAYS: No





- Related Data


Allergies/Adverse Reactions: 


                                        





Iodinated Contrast Media Allergy (Intermediate, Verified 06/08/20 17:53)


   Hives


iodine [Iodine] Allergy (Intermediate, Verified 06/08/20 17:53)


   Hives


CHLORINE Adverse Reaction (Uncoded 06/08/20 17:53)


   Hives











Past Medical History





- Past Medical History


Cardiac Medical History: Reports: Hx Hypercholesterolemia, Hx Hypertension


   Denies: Hx Coronary Artery Disease, Hx Heart Attack


Pulmonary Medical History: 


   Denies: Hx Asthma, Hx Bronchitis, Hx COPD, Hx Pneumonia, Hx Tuberculosis


Neurological Medical History: Reports: Hx Migraine.  Denies: Hx Cerebrovascular 

Accident, Hx Seizures


Renal/ Medical History: Denies: Hx Peritoneal Dialysis


GI Medical History: Reports: Hx Gastroesophageal Reflux Disease


Musculoskeltal Medical History: Denies Hx Arthritis


Past Surgical History: Reports: Hx Breast Surgery - lumpectomy, Hx 

Cholecystectomy, Hx Hysterectomy, Hx Tonsillectomy, Hx Tubal Ligation.  Denies: 

Hx Pacemaker





- Immunizations


Hx Diphtheria, Pertussis, Tetanus Vaccination: Yes





Physical Exam





- Vital signs


Vitals: 





                                        











Temp Pulse BP Pulse Ox


 


 98.8 F   87   142/81 H  96 


 


 06/08/20 17:48  06/08/20 17:48  06/08/20 17:48  06/08/20 17:48














Course





- Vital Signs


Vital signs: 





                                        











Temp Pulse Resp BP Pulse Ox


 


 98.8 F   87      142/81 H  96 


 


 06/08/20 17:48  06/08/20 17:48     06/08/20 17:48  06/08/20 17:48

## 2020-06-09 VITALS — SYSTOLIC BLOOD PRESSURE: 134 MMHG | DIASTOLIC BLOOD PRESSURE: 85 MMHG

## 2020-07-28 ENCOUNTER — HOSPITAL ENCOUNTER (EMERGENCY)
Dept: HOSPITAL 62 - ER | Age: 55
Discharge: HOME | End: 2020-07-28
Payer: COMMERCIAL

## 2020-07-28 VITALS — DIASTOLIC BLOOD PRESSURE: 75 MMHG | SYSTOLIC BLOOD PRESSURE: 115 MMHG

## 2020-07-28 DIAGNOSIS — R11.10: ICD-10-CM

## 2020-07-28 DIAGNOSIS — R10.11: ICD-10-CM

## 2020-07-28 DIAGNOSIS — I10: ICD-10-CM

## 2020-07-28 DIAGNOSIS — R10.13: ICD-10-CM

## 2020-07-28 DIAGNOSIS — F17.200: ICD-10-CM

## 2020-07-28 DIAGNOSIS — Z85.53: ICD-10-CM

## 2020-07-28 DIAGNOSIS — Z88.8: ICD-10-CM

## 2020-07-28 DIAGNOSIS — K21.9: Primary | ICD-10-CM

## 2020-07-28 LAB
ADD MANUAL DIFF: NO
ALBUMIN SERPL-MCNC: 4.9 G/DL (ref 3.5–5)
ALP SERPL-CCNC: 122 U/L (ref 38–126)
ANION GAP SERPL CALC-SCNC: 8 MMOL/L (ref 5–19)
APPEARANCE UR: CLEAR
APTT PPP: (no result) S
AST SERPL-CCNC: 22 U/L (ref 14–36)
BASOPHILS # BLD AUTO: 0.1 10^3/UL (ref 0–0.2)
BASOPHILS NFR BLD AUTO: 0.8 % (ref 0–2)
BILIRUB DIRECT SERPL-MCNC: 0 MG/DL (ref 0–0.4)
BILIRUB SERPL-MCNC: 0.3 MG/DL (ref 0.2–1.3)
BILIRUB UR QL STRIP: NEGATIVE
BUN SERPL-MCNC: 27 MG/DL (ref 7–20)
CALCIUM: 11 MG/DL (ref 8.4–10.2)
CHLORIDE SERPL-SCNC: 101 MMOL/L (ref 98–107)
CO2 SERPL-SCNC: 31 MMOL/L (ref 22–30)
EOSINOPHIL # BLD AUTO: 0.3 10^3/UL (ref 0–0.6)
EOSINOPHIL NFR BLD AUTO: 2.6 % (ref 0–6)
ERYTHROCYTE [DISTWIDTH] IN BLOOD BY AUTOMATED COUNT: 13.3 % (ref 11.5–14)
GLUCOSE SERPL-MCNC: 95 MG/DL (ref 75–110)
GLUCOSE UR STRIP-MCNC: NEGATIVE MG/DL
HCT VFR BLD CALC: 42.5 % (ref 36–47)
HGB BLD-MCNC: 14.3 G/DL (ref 12–15.5)
KETONES UR STRIP-MCNC: NEGATIVE MG/DL
LYMPHOCYTES # BLD AUTO: 3.6 10^3/UL (ref 0.5–4.7)
LYMPHOCYTES NFR BLD AUTO: 31.3 % (ref 13–45)
MCH RBC QN AUTO: 28.9 PG (ref 27–33.4)
MCHC RBC AUTO-ENTMCNC: 33.8 G/DL (ref 32–36)
MCV RBC AUTO: 86 FL (ref 80–97)
MONOCYTES # BLD AUTO: 0.9 10^3/UL (ref 0.1–1.4)
MONOCYTES NFR BLD AUTO: 7.5 % (ref 3–13)
NEUTROPHILS # BLD AUTO: 6.6 10^3/UL (ref 1.7–8.2)
NEUTS SEG NFR BLD AUTO: 57.8 % (ref 42–78)
NITRITE UR QL STRIP: NEGATIVE
PH UR STRIP: 6 [PH] (ref 5–9)
PLATELET # BLD: 318 10^3/UL (ref 150–450)
POTASSIUM SERPL-SCNC: 4.4 MMOL/L (ref 3.6–5)
PROT SERPL-MCNC: 8.5 G/DL (ref 6.3–8.2)
PROT UR STRIP-MCNC: NEGATIVE MG/DL
RBC # BLD AUTO: 4.96 10^6/UL (ref 3.72–5.28)
SP GR UR STRIP: 1.01
TOTAL CELLS COUNTED % (AUTO): 100 %
UROBILINOGEN UR-MCNC: NEGATIVE MG/DL (ref ?–2)
WBC # BLD AUTO: 11.4 10^3/UL (ref 4–10.5)

## 2020-07-28 PROCEDURE — 80053 COMPREHEN METABOLIC PANEL: CPT

## 2020-07-28 PROCEDURE — 76705 ECHO EXAM OF ABDOMEN: CPT

## 2020-07-28 PROCEDURE — 85025 COMPLETE CBC W/AUTO DIFF WBC: CPT

## 2020-07-28 PROCEDURE — 81001 URINALYSIS AUTO W/SCOPE: CPT

## 2020-07-28 PROCEDURE — S0119 ONDANSETRON 4 MG: HCPCS

## 2020-07-28 PROCEDURE — 83690 ASSAY OF LIPASE: CPT

## 2020-07-28 PROCEDURE — 36415 COLL VENOUS BLD VENIPUNCTURE: CPT

## 2020-07-28 PROCEDURE — 99284 EMERGENCY DEPT VISIT MOD MDM: CPT

## 2020-07-28 NOTE — RADIOLOGY REPORT (SQ)
EXAM DESCRIPTION:  U/S ABDOMEN LIMITED W/O DOP



IMAGES COMPLETED DATE/TIME:  7/28/2020 1:09 pm



REASON FOR STUDY:  RUQ abd pain



COMPARISON:  6/8/2020



TECHNIQUE:  Dynamic and static grayscale images acquired of the abdomen and recorded on PACS. Additio
nal selected color Doppler and spectral images recorded.



LIMITATIONS:  None.



FINDINGS:  PANCREAS: No masses.  Visualized pancreatic duct normal caliber.

LIVER: Normal size Mild fatty infiltration. No focal masses.

LIVER VASCULATURE: Normal directional flow of the main portal vein and hepatic veins.

GALLBLADDER: Surgically absent.

ULTRASOUND-DETECTED REYES'S SIGN: Not applicable.

INTRAHEPATIC DUCTS AND COMMON DUCT: CBD and intrahepatic ducts normal caliber. No filling defects.

INFERIOR VENA CAVA: Normal flow.

AORTA: No aneurysm.

RIGHT KIDNEY:  Normal size.   10 mm cyst.   No solid or suspicious masses.   No hydronephrosis.   No 
calcifications.

PERITONEAL AND RIGHT PLEURAL SPACE: No ascites or effusions.

OTHER: No other significant findings.



IMPRESSION:  No acute findings.  No significant change from 6/8/2020.



TECHNICAL DOCUMENTATION:  JOB ID:  [id number]

 [employer]- All Rights Reserved



Reading location - IP/workstation name: [email or web address]

## 2020-07-28 NOTE — ER DOCUMENT REPORT
ED Medical Screen (RME)





- General


Chief Complaint: Abdominal Pain


Stated Complaint: ABDOMINAL PAIN


Time Seen by Provider: 07/28/20 12:24


Primary Care Provider: 


BRIAN PORTILLO PA-C [Primary Care Provider] - Follow up as needed


Notes: 





Patient is a 54-year-old female who presents emergency department with a chief 

complaint of upper abdominal pain.  Patient states that this feels similar as to

when she had cholecystitis, she had a cholecystectomy.  Patient also has history

of kidney cancer on the left side.  Patient states that her pain started this 

morning.  Patient also has history of gastric ulcers in the past.  She states 

that she is out of her omeprazole.





Exam: Tender upper abdomen.





I have greeted and performed a rapid initial assessment of this patient.  A 

comprehensive ED assessment and evaluation of the patient, analysis of test 

results and completion of medical decision making process will be conducted by 

an additional ED providers.


TRAVEL OUTSIDE OF THE [place] IN LAST 30 DAYS: No





- Related Data


Allergies/Adverse Reactions: 


                                        





Iodinated Contrast Media Allergy (Intermediate, Verified 06/08/20 17:53)


   Hives


iodine [Iodine] Allergy (Intermediate, Verified 06/08/20 17:53)


   Hives


CHLORINE Adverse Reaction (Uncoded 06/08/20 17:53)


   Hives











Past Medical History





- Social History


Frequency of alcohol use: None


Drug Abuse: None





- Past Medical History


Cardiac Medical History: Reports: Hx Hypercholesterolemia, Hx Hypertension


   Denies: Hx Coronary Artery Disease, Hx Heart Attack


Pulmonary Medical History: 


   Denies: Hx Asthma, Hx Bronchitis, Hx COPD, Hx Pneumonia, Hx Tuberculosis


Neurological Medical History: Reports: Hx Migraine.  Denies: Hx Cerebrovascular 

Accident, Hx Seizures


Renal/ Medical History: Denies: Hx Peritoneal Dialysis


GI Medical History: Reports: Hx Gastroesophageal Reflux Disease


Musculoskeltal Medical History: Denies Hx Arthritis


Past Surgical History: Reports: Hx Breast Surgery - lumpectomy, Hx 

Cholecystectomy, Hx Hysterectomy, Hx Tonsillectomy, Hx Tubal Ligation.  Denies: 

Hx Pacemaker





- Immunizations


Hx Diphtheria, Pertussis, Tetanus Vaccination: Yes





Physical Exam





- Vital signs


Vitals: 





                                        











Temp Pulse Resp BP Pulse Ox


 


 99.0 F   95   20   131/81 H  96 


 


 07/28/20 12:20  07/28/20 12:20  07/28/20 12:20  07/28/20 12:20  07/28/20 12:20














Course





- Vital Signs


Vital signs: 





                                        











Temp Pulse Resp BP Pulse Ox


 


 99.0 F   95   20   131/81 H  96 


 


 07/28/20 12:20  07/28/20 12:20  07/28/20 12:20  07/28/20 12:20  07/28/20 12:20














Doctor's Discharge





- Discharge


Referrals: 


BRIAN PORTILLO PA-C [Primary Care Provider] - Follow up as needed

## 2020-07-28 NOTE — ER DOCUMENT REPORT
ED General





- General


Chief Complaint: Abdominal Pain


Stated Complaint: ABDOMINAL PAIN


Time Seen by Provider: 07/28/20 12:24


Primary Care Provider: 


BRIAN PORTILLO PA-C [Primary Care Provider] - Follow up as needed


Mode of Arrival: Ambulatory


Information source: Patient


TRAVEL OUTSIDE OF THE [place] IN LAST 30 DAYS: No





- HPI


Notes: 





Patient complains of right upper quadrant abdominal pain.  She also states that 

she has had some burning in the epigastric area going to her back and that she 

feels this is consistent with her reflux pain.  She states she is out of her 

reflux medication.  She also states she has a history of renal cancer and 

recently had a new cyst on a kidney detected and does have follow-up at [facility] 

this month for that.  She states the pain in the right upper quadrant has been 

for 1 to 2 days.  It is been relatively constant.  It is an aching pain.  It is 

moderate in intensity.  She does not know anything makes it better or worse.  

She has had one episode of vomiting.  No problems with urine or stool.





- Related Data


Allergies/Adverse Reactions: 


                                        





Iodinated Contrast Media Allergy (Intermediate, Verified 06/08/20 17:53)


   Hives


iodine [Iodine] Allergy (Intermediate, Verified 06/08/20 17:53)


   Hives


CHLORINE Adverse Reaction (Uncoded 06/08/20 17:53)


   Hives











Past Medical History





- General


Information source: Patient





- Social History


Smoking Status: Current Every Day Smoker


Frequency of alcohol use: None


Drug Abuse: None


Family History: Reviewed & Not Pertinent, CAD - Brother with 3 vessel bypass at 

53 years old., DM, Hypertension





- Past Medical History


Cardiac Medical History: Reports: Hx Hypercholesterolemia, Hx Hypertension


   Denies: Hx Coronary Artery Disease, Hx Heart Attack


Pulmonary Medical History: 


   Denies: Hx Asthma, Hx Bronchitis, Hx COPD, Hx Pneumonia, Hx Tuberculosis


Neurological Medical History: Reports: Hx Migraine.  Denies: Hx Cerebrovascular 

Accident, Hx Seizures


Renal/ Medical History: Denies: Hx Peritoneal Dialysis


GI Medical History: Reports: Hx Gastroesophageal Reflux Disease


Musculoskeletal Medical History: Denies Hx Arthritis


Past Surgical History: Reports: Hx Breast Surgery - lumpectomy, Hx 

Cholecystectomy, Hx Hysterectomy, Hx Tonsillectomy, Hx Tubal Ligation.  Denies: 

Hx Pacemaker





- Immunizations


Hx Diphtheria, Pertussis, Tetanus Vaccination: Yes





Review of Systems





- Review of Systems


Constitutional: denies: Chills, Fever


Cardiovascular: denies: Chest pain, Palpitations


Respiratory: denies: Cough, Short of breath


-: Yes All other systems reviewed and negative





Physical Exam





- Vital signs


Vitals: 





                                        











Temp Pulse Resp BP Pulse Ox


 


 99.0 F   95   20   131/81 H  96 


 


 07/28/20 12:20  07/28/20 12:20  07/28/20 12:20  07/28/20 12:20  07/28/20 12:20











Interpretation: Normal





- General


General appearance: Appears well, Alert





- HEENT


Head: Normocephalic, Atraumatic


Eyes: Normal


Pupils: PERRL





- Respiratory


Respiratory status: No respiratory distress


Chest status: Nontender


Breath sounds: Normal


Chest palpation: Normal





- Cardiovascular


Rhythm: Regular


Heart sounds: Normal auscultation


Murmur: No





- Abdominal


Inspection: Normal


Distension: No distension


Bowel sounds: Normal


Tenderness: Tender - Mild tenderness right upper quadrant to palpation.  No 

surgical abdominal signs.


Organomegaly: No organomegaly





- Back


Back: Normal, Nontender





- Extremities


General upper extremity: Normal inspection, Nontender, Normal color, Normal ROM,

 Normal temperature


General lower extremity: Normal inspection, Nontender, Normal color, Normal ROM,

 Normal temperature, Normal weight bearing.  No: Alexandra's sign





- Neurological


Neuro grossly intact: Yes


Cognition: Normal


Orientation: AAOx4


Sadiq Coma Scale Eye Opening: Spontaneous


Sadiq Coma Scale Verbal: Oriented


[place] Coma Scale Motor: Obeys Commands


Sadiq Coma Scale Total: 15


Speech: Normal


Motor strength normal: LUE, RUE, LLE, RLE


Sensory: Normal





- Psychological


Associated symptoms: Normal affect, Normal mood





- Skin


Skin Temperature: Warm


Skin Moisture: Dry


Skin Color: Normal





Course





- Re-evaluation


Re-evalutation: 





07/28/20 16:56


Patient has right upper quadrant abdominal pain.  She did not have surgical 

abdominal signs at this time.  Based upon laboratories she does not have any 

significant evidence of liver or common bile duct stone.  Ultrasound shows no 

evidence of liver or common bile duct pathology.  There is no evidence of any 

infectious process in the urinary tract.  Patient is already had her gallbladder

 resected.  She did get relief with anti-reflux medication.  I will discharge 

the patient with Carafate and have her follow-up with [facility] as scheduled.





- Vital Signs


Vital signs: 





                                        











Temp Pulse Resp BP Pulse Ox


 


 99.0 F   95   20   131/81 H  96 


 


 07/28/20 12:20  07/28/20 12:20  07/28/20 12:20  07/28/20 12:20  07/28/20 12:20














- Laboratory


Result Diagrams: 


                                 07/28/20 12:40





                                 07/28/20 12:40


Laboratory results interpreted by me: 





                                        











  07/28/20 07/28/20 07/28/20





  12:40 12:40 12:40


 


WBC  11.4 H  


 


Carbon Dioxide   31 H 


 


BUN   27 H 


 


Calcium   11.0 H 


 


Total Protein   8.5 H 


 


Urine Blood    MODERATE H














- Diagnostic Test


Radiology reviewed: Image reviewed, Reports reviewed





Discharge





- Discharge


Clinical Impression: 


 Right upper quadrant abdominal pain





GERD (gastroesophageal reflux disease)


Qualifiers:


 Esophagitis presence: esophagitis presence not specified Qualified Code(s): 

K21.9 - Gastro-esophageal reflux disease without esophagitis





Condition: Stable


Disposition: HOME, SELF-CARE


Instructions:  Abdominal Pain ([facility])


Additional Instructions: 


Please follow-up with [facility] as instructed


Prescriptions: 


Sucralfate [Carafate Susp 1 Gm/10 Ml Udcup] 1 gm PO Q6 14 Days #200 ml


Forms:  Return to Work


Referrals: 


BRIAN PORTILLO PA-C [Primary Care Provider] - Follow up in 3-5 days

## 2020-09-28 NOTE — EKG REPORT
SEVERITY:- ABNORMAL ECG -

SINUS RHYTHM

PROBABLE LEFT ATRIAL ABNORMALITY

BORDERLINE RIGHT AXIS DEVIATION

CONSIDER ANTEROSEPTAL INFARCT

:

Confirmed by: Aide Berry 22-Dec-2019 17:13:12
SEVERITY:- ABNORMAL ECG -

SINUS RHYTHM

PROBABLE LEFT ATRIAL ABNORMALITY

PROBABLE ANTEROSEPTAL INFARCT, AGE INDETERM

:

Confirmed by: Aide Berry 22-Dec-2019 17:13:22
yes